# Patient Record
Sex: FEMALE | Race: WHITE
[De-identification: names, ages, dates, MRNs, and addresses within clinical notes are randomized per-mention and may not be internally consistent; named-entity substitution may affect disease eponyms.]

---

## 2018-01-17 ENCOUNTER — HOSPITAL ENCOUNTER (INPATIENT)
Dept: HOSPITAL 45 - C.LD | Age: 28
LOS: 3 days | Discharge: HOME | End: 2018-01-20
Attending: OBSTETRICS & GYNECOLOGY | Admitting: OBSTETRICS & GYNECOLOGY
Payer: COMMERCIAL

## 2018-01-17 VITALS
WEIGHT: 195 LBS | HEIGHT: 67.99 IN | BODY MASS INDEX: 29.55 KG/M2 | WEIGHT: 195 LBS | HEIGHT: 67.99 IN | BODY MASS INDEX: 29.55 KG/M2

## 2018-01-17 DIAGNOSIS — Z3A.39: ICD-10-CM

## 2018-01-17 LAB
ALBUMIN SERPL-MCNC: 2.9 GM/DL (ref 3.4–5)
ALP SERPL-CCNC: 158 U/L (ref 45–117)
ALT SERPL-CCNC: 20 U/L (ref 12–78)
AST SERPL-CCNC: 22 U/L (ref 15–37)
BUN SERPL-MCNC: 7 MG/DL (ref 7–18)
CALCIUM SERPL-MCNC: 8.8 MG/DL (ref 8.5–10.1)
CO2 SERPL-SCNC: 20 MMOL/L (ref 21–32)
CREAT SERPL-MCNC: 0.5 MG/DL (ref 0.6–1.2)
EOSINOPHIL NFR BLD AUTO: 177 K/UL (ref 130–400)
GLUCOSE SERPL-MCNC: 102 MG/DL (ref 70–99)
HCT VFR BLD CALC: 39.2 % (ref 37–47)
HGB BLD-MCNC: 13.9 G/DL (ref 12–16)
MCH RBC QN AUTO: 31.3 PG (ref 25–34)
MCHC RBC AUTO-ENTMCNC: 35.5 G/DL (ref 32–36)
MCV RBC AUTO: 88.3 FL (ref 80–100)
PMV BLD AUTO: 11.2 FL (ref 7.4–10.4)
POTASSIUM SERPL-SCNC: 3.4 MMOL/L (ref 3.5–5.1)
PROT SERPL-MCNC: 6.8 GM/DL (ref 6.4–8.2)
RED CELL DISTRIBUTION WIDTH CV: 13.2 % (ref 11.5–14.5)
RED CELL DISTRIBUTION WIDTH SD: 42.6 FL (ref 36.4–46.3)
SODIUM SERPL-SCNC: 137 MMOL/L (ref 136–145)
WBC # BLD AUTO: 13.15 K/UL (ref 4.8–10.8)

## 2018-01-18 VITALS
DIASTOLIC BLOOD PRESSURE: 79 MMHG | OXYGEN SATURATION: 99 % | TEMPERATURE: 99.86 F | SYSTOLIC BLOOD PRESSURE: 118 MMHG | HEART RATE: 82 BPM

## 2018-01-18 VITALS
HEART RATE: 102 BPM | DIASTOLIC BLOOD PRESSURE: 69 MMHG | TEMPERATURE: 98.24 F | OXYGEN SATURATION: 96 % | SYSTOLIC BLOOD PRESSURE: 114 MMHG

## 2018-01-18 VITALS — DIASTOLIC BLOOD PRESSURE: 84 MMHG | HEART RATE: 82 BPM | TEMPERATURE: 97.7 F | SYSTOLIC BLOOD PRESSURE: 121 MMHG

## 2018-01-18 VITALS — DIASTOLIC BLOOD PRESSURE: 76 MMHG | HEART RATE: 95 BPM | SYSTOLIC BLOOD PRESSURE: 118 MMHG | TEMPERATURE: 98.06 F

## 2018-01-18 VITALS — DIASTOLIC BLOOD PRESSURE: 86 MMHG | TEMPERATURE: 97.7 F | HEART RATE: 86 BPM | SYSTOLIC BLOOD PRESSURE: 130 MMHG

## 2018-01-18 RX ADMIN — DOCUSATE SODIUM SCH MG: 100 CAPSULE, LIQUID FILLED ORAL at 09:28

## 2018-01-18 RX ADMIN — ROPIVACAINE HYDROCHLORIDE PRN ML: 2 INJECTION, SOLUTION EPIDURAL; INFILTRATION at 03:09

## 2018-01-18 RX ADMIN — Medication PRN MG: at 21:41

## 2018-01-18 RX ADMIN — Medication SCH TAB: at 09:28

## 2018-01-18 RX ADMIN — FERROUS SULFATE TAB EC 325 MG (65 MG FE EQUIVALENT) SCH MG: 325 (65 FE) TABLET DELAYED RESPONSE at 09:28

## 2018-01-18 RX ADMIN — Medication PRN MG: at 09:29

## 2018-01-18 RX ADMIN — ROPIVACAINE HYDROCHLORIDE PRN ML: 2 INJECTION, SOLUTION EPIDURAL; INFILTRATION at 05:39

## 2018-01-18 RX ADMIN — DOCUSATE SODIUM SCH MG: 100 CAPSULE, LIQUID FILLED ORAL at 21:52

## 2018-01-18 NOTE — ANESTHESIA PROCEDURE NOTE
Anesthesia Epidural Removal Nt


Date & Time


Jan 18, 2018 at 08:23





Vital Signs


Pain Intensity:  0.0





Notes


Mental Status:  alert / awake / arousable, participated in evaluation


Nausea / Vomiting:  adequately controlled


Pain:  adequately controlled


Airway Patency, RR, SpO2:  stable & adequate


BP & HR:  stable & adequate


Hydration State:  stable & adequate


Neuraxial Anesthesia:  was administered


Anesthetic Complications:  no major complications apparent, pt satisfied with 

anesthetic care


Epidural:  removed without complications, with tip intact

## 2018-01-18 NOTE — DELIVERY SUMMARY
DATE OF OPERATION:  2018

 

DATE OF DELIVERY:  2018

 

TIME OF DELIVERY OF BABY:  06:11 a.m.

 

TIME OF DELIVERY OF PLACENTA:  06:18 a.m.

 

DETAILS OF DELIVERY:  The patient was found to be fully dilated and desired

to push.  She pushed for about 1-1/2 hours and the head was  over

perineum with a tight hymenal ring band and patient was exhausted.  After

verbal consent was obtained, small right medial lateral episiotomy was opened

and then the head was delivered without difficulty.  Then shoulders were

delivered with minimal traction.  Baby was handed off to the mother where

mouth and nose were suctioned.  Cord was clamped x2 and cut at 1 minute delay

and cord blood was obtained.  The perineum and vagina were checked for

lacerations.  There was only small right medial lateral episiotomy which was

opened earlier.  No other lacerations were found and it was repaired with 2-0

Vicryl in a running locked fashion.  Excellent hemostasis was achieved.  The

placenta was found to be in the vagina and delivered spontaneously intact and

complete.  Uterus was explored and found to be empty.  Lower segment was

cleared off all clots and debris.  Uterine massage was done.  EBL was 400. 

Rectal exam was done.  Good external sphincter tone was noted and no sutures

were found.  Mom and baby tolerated the procedure well.  Sponge, lap, needle

and instrument counts were correct x2.  Baby was a viable male infant. 

Apgars 8/9, weight is pending.  No complications happened and I was present

during whole procedure.

 

 

I attest to the content of the Intraoperative Record and any orders documented therein. Any exception
s are noted below.

## 2018-01-19 VITALS
SYSTOLIC BLOOD PRESSURE: 121 MMHG | TEMPERATURE: 98.78 F | DIASTOLIC BLOOD PRESSURE: 81 MMHG | OXYGEN SATURATION: 98 % | HEART RATE: 97 BPM

## 2018-01-19 VITALS — SYSTOLIC BLOOD PRESSURE: 116 MMHG | TEMPERATURE: 98.06 F | HEART RATE: 77 BPM | DIASTOLIC BLOOD PRESSURE: 70 MMHG

## 2018-01-19 VITALS — HEART RATE: 80 BPM | SYSTOLIC BLOOD PRESSURE: 130 MMHG | DIASTOLIC BLOOD PRESSURE: 82 MMHG | TEMPERATURE: 98.06 F

## 2018-01-19 LAB
HCT VFR BLD CALC: 30 % (ref 37–47)
HGB BLD-MCNC: 10.1 G/DL (ref 12–16)

## 2018-01-19 RX ADMIN — DOCUSATE SODIUM SCH MG: 100 CAPSULE, LIQUID FILLED ORAL at 08:17

## 2018-01-19 RX ADMIN — FERROUS SULFATE TAB EC 325 MG (65 MG FE EQUIVALENT) SCH MG: 325 (65 FE) TABLET DELAYED RESPONSE at 08:17

## 2018-01-19 RX ADMIN — DOCUSATE SODIUM SCH MG: 100 CAPSULE, LIQUID FILLED ORAL at 20:27

## 2018-01-19 RX ADMIN — Medication PRN MG: at 10:51

## 2018-01-19 RX ADMIN — Medication SCH TAB: at 08:17

## 2018-01-19 NOTE — OB/GYN PROGRESS NOTE
OB/GYN Progress Note


Date of Service


Jan 19, 2018.





Subjective


conversation w/ patient, physical exam


Ambulation:  ambulating normally


Voiding:  no voiding problems


Passing Gas:  Yes


Diet Tolerance:  Regular Diet


Feeding Type:  Breast Feeding





Objective


Vital Signs











  Date Time  Temp Pulse Resp B/P (MAP) Pulse Ox O2 Delivery O2 Flow Rate FiO2


 


1/19/18 07:20 36.7 77 18 116/70 (85)  Room Air  


 


1/19/18 07:20      Room Air  


 


1/19/18 04:00 36.7 80 18 130/82 (98)  Room Air  


 


1/18/18 23:45      Room Air  


 


1/18/18 23:45 36.5 86 18 130/86 (101)  Room Air  


 


1/18/18 19:30      Room Air  


 


1/18/18 19:30 36.7 95 20 118/76 (90)  Room Air  


 


1/18/18 16:00 36.5 82 22 121/84 (96)    


 


1/18/18 12:15 36.8 102 20 114/69 (84) 96 Room Air  











Physical Exam


General Appearance:  WELL-APPEARING, NO APPARENT DISTRESS


Fundus:  Firm


Extremities:  non-tender, normal inspection, no pedal edema





Laboratory Results





Last 24 Hours








Test


  1/19/18


08:08


 


Hemoglobin 10.1 g/dL 


 


Hematocrit 30.0 % 











Assessment and Plan


Post-Partum


Day Number:  1


Continue Routine Care:


tent d/c in AM

## 2018-01-20 VITALS — HEART RATE: 98 BPM | TEMPERATURE: 97.88 F | DIASTOLIC BLOOD PRESSURE: 80 MMHG

## 2018-01-20 VITALS — SYSTOLIC BLOOD PRESSURE: 120 MMHG | TEMPERATURE: 97.88 F | DIASTOLIC BLOOD PRESSURE: 80 MMHG | HEART RATE: 98 BPM

## 2018-01-20 VITALS — TEMPERATURE: 98.06 F | SYSTOLIC BLOOD PRESSURE: 116 MMHG | HEART RATE: 79 BPM | DIASTOLIC BLOOD PRESSURE: 66 MMHG

## 2018-01-20 LAB
EOSINOPHIL NFR BLD AUTO: 195 K/UL (ref 130–400)
HCT VFR BLD CALC: 31.5 % (ref 37–47)
HGB BLD-MCNC: 10.8 G/DL (ref 12–16)
MCH RBC QN AUTO: 31 PG (ref 25–34)
MCHC RBC AUTO-ENTMCNC: 34.3 G/DL (ref 32–36)
MCV RBC AUTO: 90.5 FL (ref 80–100)
PMV BLD AUTO: 11 FL (ref 7.4–10.4)
RED CELL DISTRIBUTION WIDTH CV: 13.6 % (ref 11.5–14.5)
RED CELL DISTRIBUTION WIDTH SD: 45.1 FL (ref 36.4–46.3)
WBC # BLD AUTO: 11.98 K/UL (ref 4.8–10.8)

## 2018-01-20 RX ADMIN — DOCUSATE SODIUM SCH MG: 100 CAPSULE, LIQUID FILLED ORAL at 08:36

## 2018-01-20 RX ADMIN — Medication SCH TAB: at 08:36

## 2018-01-20 RX ADMIN — FERROUS SULFATE TAB EC 325 MG (65 MG FE EQUIVALENT) SCH MG: 325 (65 FE) TABLET DELAYED RESPONSE at 08:36

## 2018-01-20 NOTE — DISCHARGE INSTRUCTIONS
Discharge Instructions


Date of Service


Jan 20, 2018.





Admission


Reason for Admission:  Check Labor





Discharge


Discharge Diagnosis / Problem:  postpartum





Discharge Goals


Goal(s):  Routine recovery after delivery





Activity Recommendations


Activity Limitations:  as noted below





ACTIVITY RECOMMENDATIONS:





* Gradual return to full activity over the next 2-3 weeks.


* No lifting - nothing heavier than baby over the next 2-3 weeks.


* Do not engage in vigorous exercise, sexual activity or sports until cleared 

by 


   your physician.


* Do not drive or operate any motorized equipment until cleared by your 

physician.


* You may shower/bathe daily.








BREAST CARE:





If you are not breast feeding:





*  Wear a supportive bra 24 hours a day for one to two weeks.


*  Avoid stimulating your breasts and nipples as much as possible during the


    first few weeks after delivery.


*  When taking a shower, have the warm water hit your back, not breasts.


*  When your breasts feel full, apply ice packs.  Usually three to four times 


    a day helps ease the discomfort.


*  Take a mild pain medication (Tylenol/Motrin) when you are uncomfortable.





If breast feeding:





*  Use breast milk to lubricate nipples.  Lansinoh cream may be used for sore 

nipples. 


    You do not need to remove cream prior to breast feeding.  If using a 

different 


    brand of cream, check the label for directions regarding removal of cream 

prior 


    to nursing.


*  Wear a supportive bra.


*  If having problems with breasts or breast feeding, call a lactation 

consultant or 


    your health care provider.








EPISIOTOMY CARE:





After delivery, if you have an episiotomy (stitches), the following steps will 


ease discomfort and aid healing.





*   For the first 24 hours after delivery, place ice packs next to your 

episiotomy 


    to help reduce swelling.


*  After the first 24 hour-period, sitz baths, either portable or in the tub, 

are 


    suggested.  A shower with a shower arm sprayed over the episiotomy may 


    be comforting.


*  Camille care should be done after each voiding and bowel movement.  Squirt 


    warm water from a plastic bottle over the perineum (region of the body 


    between the anus and urinary opening) and pat dry.


*  Use Dermoplast to ease discomfort.  Shake container.  Spray directly over


    the episiotomy.  


*  Place a Tucks on a clean sanitary pad next to your episiotomy.








OVER THE COUNTER MEDICATION:





*  For discomfort or pain, you may use Acetaminophen (Tylenol), Ibuprofen (Advil

), or 


    Naproxen (Aleve) following the package directions. 


*  For constipation you may use Colace following the package directions.








SPECIAL CARE INSTRUCTIONS:





When you are discharged from the hospital, it is important for you to follow 


the instructions listed below:





*  During the first week at home, you should be able to care for yourself and


    your baby.  In addition, the usual light household activities are 

encouraged.





*  Limit your activities to the way you feel.  Do not try to clean the house or 


    move furniture.  Be sensible.





*  If you actively engage in sports and have done so up until the time of your


   delivery, you may resume these activities as soon as you feel able.  This may


   take up to one month or even longer.  Use good judgment.





*  Continue to take your prenatal vitamins for at least six weeks after the 

birth


    of your baby.





*  Your diet need not be limited unless you were on a special diet before your 


    delivery.  Breast-feeding mothers need around 2500 calories per day and at


    least 64-80 ounces of fluid per day (8 to 10 glasses).





*  You should eat foods from the four major food groups.  Crash diets or fad 

diets


    are to be avoided.  Eating lean meats, fresh fruits and vegetables, low-fat 


    dairy products, high fiber foods and a regular exercise program, will help 

you 


    get back to your pre-pregnancy weight without putting your health at risk.





*  Constipation is sometimes a problem after delivery.  Take a mild laxative as 


    needed.  If breast feeding, Milk of Magnesia is acceptable to use. You may 


    use a suppository or Fleets enema if no episiotomy.





*  A daily shower or tub bath is suggested.  Be sure to thoroughly and gently 


    dry the perineum.





*  A bloody vaginal discharge will usually continue until around four weeks post


    partum.  A small amount of bleeding may continue for as long as six weeks.  


    Vaginal discharge changes from the bright red bleeding after delivery to 

pink 


    then brownish and finally yellowish-pink before becoming white and 

disappearing.





*  Bleeding may increase with activity.  Your first period may come in 4-8 

weeks.


    If you are breast feeding, your period may be delayed even longer.





*  [place] (sex) can begin whenever both you and your partner feel 

comfortable


    and do not have any form of genital infection.  It is recommended that you 

wait


    until after your return postpartum appointment and discuss with your 

physician.


    If you have questions, please talk to your health care practitioner.  A 

condom 


    should be used to prevent infection and pregnancy.





*  Foreplay, gentle intercourse and lubrication is very important the first 

several 


    times to prevent pain.  A water-based lubricant such as K-Y jelly or 

Astroglide 


    may be used.





*  Tampons may be used six weeks after delivery.





*  Douching should be avoided for 6 weeks after delivery.





*  If you have RH negative blood and your baby is RH positive, you will receive 


    RHOGAM by injection prior to discharge.  The nurse will give you a card to 


    keep with you that has the date and place that you received RHOGAM after 


    delivery.





*  During your prenatal care, you had a Rubella screen done to check for the 


    presence of rubella antibodies in your blood.  If your test was negative, 

you


    will receive a Rubella vaccine prior to discharge.  This vaccine may cause 

a 


    fever, soreness at the injection site and flu-like symptoms.  If these 

symptoms


    persist, notify your health care practitioner.   Pregnancy is not advised 

for 


    three months after a Rubella vaccine.  There is a higher chance of having a 


    baby with birth defects if conceived within three months of getting the 

vaccine.





*  If you were discharged 24 hours from delivery or before 48 hours: Visiting 

nurses 


    will come to your home 48 hours after discharge to assess you and your 

baby.  


    The visiting nurse will meet with you while you are in the hospital to 

arrange a 


    time and get directions to your home.





*  Verbalizes understanding of car seat law as reviewed with patient nursing.





*  Car Seat hand-out given and reviewed with patient by nursing.





*  Shaken baby information reviewed with patient by nursing.


 





Call you doctor if:





*  Heavy bleeding (saturating several pads an hour) or passing clots the size 

of 


    your fist.


*  A fever >101 degrees F (38.3 degrees C) on two occasions four hours apart 


    and/or chills.


*  Unusual pain in the pelvic or vaginal areas.


*  "Baby Blues" lasting longer than two weeks.





If you have any questions or concerns, call your health care practitioner 


at [phone].








FOLLOW-UP VISIT:





*  Please call the office at [phone] to schedule a 6 week postpartum


    examination.  It is important you keep this appointment.  


*  It is important for you to make arrangements for either yearly or twice 


    yearly check-ups thereafter.











.





Current Hospital Diet


Patient's current hospital diet: Regular OB Diet





Discharge Diet


Recommended Diet:  Regular Diet





Pending Studies


Studies pending at discharge:  no





Medical Emergencies








.


Who to Call and When:





Medical Emergencies:  If at any time you feel your situation is an emergency, 

please call 911 immediately.





.





Non-Emergent Contact


Non-Emergency issues call your:  Specialist





.


.








"Provider Documentation" section prepared by Paco Aguero.








.





VTE Core Measure


Inpt VTE Proph given/why not?:  Treatment not indicated

## 2018-01-20 NOTE — OB/GYN PROGRESS NOTE
OB/GYN Progress Note


Date of Service


Jan 20, 2018.





Subjective


conversation w/ patient, physical exam


Ambulation:  ambulating normally


Voiding:  no voiding problems


Passing Gas:  Yes


Diet Tolerance:  Regular Diet


Lochia:  Moderate





Review of Systems


Constitutional:  No fever, No chills, No sweats, No weight loss, No weakness, 

No fatigue, No problem reported


Respiratory:  No cough, No sputum, No wheezing, No shortness of breath, No 

dyspnea on exertion, No dyspnea at rest, No hemoptysis, No problem reported


Cardiac:  No chest pain, No orthopnea, No PND, No edema, No claudication, No 

palpitations, No problem reported


Breast:  No see HPI, No breast lump, No change in shape, No nipple discharge, 

No breast pain, No problem reported


Abdomen:  No pain, No nausea, No vomiting, No diarrhea, No constipation, No GI 

bleeding, No problem reported


Female :  No see HPI, No dysuria, No urinary frequency, No hematuria, No 

incontinence, No abnormal vaginal bleeding, No vaginal discharge, No problem 

reported





Objective


Vital Signs











  Date Time  Temp Pulse Resp B/P (MAP) Pulse Ox O2 Delivery O2 Flow Rate FiO2


 


1/20/18 00:00      Room Air  


 


1/20/18 00:00 36.7 79 18 116/66 (83)    


 


1/19/18 15:50     98 Room Air  


 


1/19/18 15:50 37.1 97 16 121/81 (94) 98 Room Air  











Physical Exam


General Appearance:  WELL-APPEARING, WD/WN, NO APPARENT DISTRESS


Respiratory/Chest:  chest non-tender, lungs clear, normal breath sounds


Cardiovascular:  regular rate, rhythm, no edema, no gallop


Abdomen:  normal bowel sounds, non tender, soft


Fundus:  Firm


Incision Description:  Clean, Dry & Intact


Extremities:  normal range of motion, non-tender, normal inspection





Laboratory Results





Last 24 Hours








Test


  1/20/18


07:49


 


White Blood Count 11.98 K/uL 


 


Red Blood Count 3.48 M/uL 


 


Hemoglobin 10.8 g/dL 


 


Hematocrit 31.5 % 


 


Mean Corpuscular Volume 90.5 fL 


 


Mean Corpuscular Hemoglobin 31.0 pg 


 


Mean Corpuscular Hemoglobin


Concent 34.3 g/dl 


 


 


RDW Standard Deviation 45.1 fL 


 


RDW Coefficient of Variation 13.6 % 


 


Platelet Count 195 K/uL 


 


Mean Platelet Volume 11.0 fL 











Assessment and Plan


Post-Partum


Day Number:  2


Continue Routine Care:


PPD #2


pt doing well


no complaints


disch home with instructions